# Patient Record
Sex: MALE | Race: BLACK OR AFRICAN AMERICAN | Employment: FULL TIME | URBAN - METROPOLITAN AREA
[De-identification: names, ages, dates, MRNs, and addresses within clinical notes are randomized per-mention and may not be internally consistent; named-entity substitution may affect disease eponyms.]

---

## 2017-05-20 ENCOUNTER — HOSPITAL ENCOUNTER (EMERGENCY)
Age: 37
Discharge: HOME | End: 2017-05-20
Attending: EMERGENCY MEDICINE
Payer: SELF-PAY

## 2017-05-20 ENCOUNTER — APPOINTMENT (OUTPATIENT)
Dept: GENERAL RADIOLOGY | Age: 37
End: 2017-05-20
Attending: EMERGENCY MEDICINE
Payer: SELF-PAY

## 2017-05-20 VITALS
SYSTOLIC BLOOD PRESSURE: 177 MMHG | BODY MASS INDEX: 47.74 KG/M2 | DIASTOLIC BLOOD PRESSURE: 103 MMHG | WEIGHT: 315 LBS | TEMPERATURE: 98.1 F | HEART RATE: 83 BPM | OXYGEN SATURATION: 98 % | HEIGHT: 68 IN | RESPIRATION RATE: 18 BRPM

## 2017-05-20 DIAGNOSIS — S39.012A BACK STRAIN, INITIAL ENCOUNTER: Primary | ICD-10-CM

## 2017-05-20 PROCEDURE — 96372 THER/PROPH/DIAG INJ SC/IM: CPT

## 2017-05-20 PROCEDURE — 72100 X-RAY EXAM L-S SPINE 2/3 VWS: CPT

## 2017-05-20 PROCEDURE — 74011250636 HC RX REV CODE- 250/636: Performed by: EMERGENCY MEDICINE

## 2017-05-20 PROCEDURE — 99282 EMERGENCY DEPT VISIT SF MDM: CPT

## 2017-05-20 RX ORDER — PREDNISONE 20 MG/1
TABLET ORAL
Qty: 14 TAB | Refills: 0 | Status: SHIPPED | OUTPATIENT
Start: 2017-05-20

## 2017-05-20 RX ORDER — OXYCODONE AND ACETAMINOPHEN 5; 325 MG/1; MG/1
1 TABLET ORAL
Qty: 10 TAB | Refills: 0 | Status: SHIPPED | OUTPATIENT
Start: 2017-05-20

## 2017-05-20 RX ORDER — KETOROLAC TROMETHAMINE 30 MG/ML
30 INJECTION, SOLUTION INTRAMUSCULAR; INTRAVENOUS
Status: COMPLETED | OUTPATIENT
Start: 2017-05-20 | End: 2017-05-20

## 2017-05-20 RX ADMIN — KETOROLAC TROMETHAMINE 30 MG: 30 INJECTION, SOLUTION INTRAMUSCULAR at 10:11

## 2017-05-20 NOTE — ED NOTES
Discharge instructions given by Dr Priyank Lima. Pt ambulates to front door with steady gait and in stable condition. Pt left prior to d/c vital signs.

## 2017-05-20 NOTE — DISCHARGE INSTRUCTIONS
Back Strain: Care Instructions  Your Care Instructions    Back strain happens when you overstretch, or pull, a muscle in your back. You may hurt your back in an accident or when you exercise or lift something. Most back pain will get better with rest and time. You can take care of yourself at home to help your back heal.  Follow-up care is a key part of your treatment and safety. Be sure to make and go to all appointments, and call your doctor if you are having problems. It's also a good idea to know your test results and keep a list of the medicines you take. How can you care for yourself at home? · Try to stay as active as you can, but stop or reduce any activity that causes pain. · Put ice or a cold pack on the sore muscle for 10 to 20 minutes at a time to stop swelling. Try this every 1 to 2 hours for 3 days (when you are awake) or until the swelling goes down. Put a thin cloth between the ice pack and your skin. · After 2 or 3 days, apply a heating pad on low or a warm cloth to your back. Some doctors suggest that you go back and forth between hot and cold treatments. · Take pain medicines exactly as directed. ¨ If the doctor gave you a prescription medicine for pain, take it as prescribed. ¨ If you are not taking a prescription pain medicine, ask your doctor if you can take an over-the-counter medicine. · Try sleeping on your side with a pillow between your legs. Or put a pillow under your knees when you lie on your back. These measures can ease pain in your lower back. · Return to your usual level of activity slowly. When should you call for help? Call 911 anytime you think you may need emergency care. For example, call if:  · You are unable to move a leg at all. Call your doctor now or seek immediate medical care if:  · You have new or worse symptoms in your legs, belly, or buttocks. Symptoms may include:  ¨ Numbness or tingling. ¨ Weakness. ¨ Pain.   · You lose bladder or bowel control. Watch closely for changes in your health, and be sure to contact your doctor if you are not getting better as expected. Where can you learn more? Go to http://adrian-sindy.info/. Enter M910 in the search box to learn more about \"Back Strain: Care Instructions. \"  Current as of: May 23, 2016  Content Version: 11.2  © 0536-3253 [employer]. Care instructions adapted under license by [employer] (which disclaims liability or warranty for this information). If you have questions about a medical condition or this instruction, always ask your healthcare professional. [place] [address] any warranty or liability for your use of this information. We hope that we have addressed all of your medical concerns. The examination and treatment you received in the Emergency Department were for an emergent problem and were not intended as complete care. It is important that you follow up with your healthcare provider(s) for ongoing care. If your symptoms worsen or do not improve as expected, and you are unable to reach your usual health care provider(s), you should return to the Emergency Department. Today's healthcare is undergoing tremendous change, and patient satisfaction surveys are one of the many tools to assess the quality of medical care. You may receive a survey from the [employer] regarding your experience in the Emergency Department. I hope that your experience has been completely positive, particularly the medical care that I provided. As such, please participate in the survey; anything less than excellent does not meet my expectations or intentions. [facility] and [facility] participate in nationally recognized quality of care measures.   If your blood pressure is greater than 120/80, as reported below, we urge that you seek medical care to address the potential of high blood pressure, commonly known as hypertension. Hypertension can be hereditary or can be caused by certain medical conditions, pain, stress, or \"white coat syndrome. \"       Please make an appointment with your health care provider(s) for follow up of your Emergency Department visit. VITALS:   Patient Vitals for the past 8 hrs:   Temp Pulse Resp BP SpO2   05/20/17 0901 98.1 °F (36.7 °C) 83 18 (!) 177/103 98 %          Thank you for allowing us to provide you with medical care today. We realize that you have many choices for your emergency care needs. Please choose us in the future for any continued health care needs. MD RUTH ANN Foley [facility]: [phone]            No results found for this or any previous visit (from the past 24 hour(s)). Xr Spine Lumb 2 Or 3 V    Result Date: 5/20/2017  EXAM:  XR SPINE LUMB 2 OR 3 V INDICATION:   Patient hurt his back at the gym a week ago. Pain from lower back into the left calf. COMPARISON: None. FINDINGS: AP, lateral and spot lateral views of the lumbar spine demonstrate normal alignment. The vertebral body heights and disc spaces are well-preserved. There is no fracture, subluxation or other abnormality. IMPRESSION:  Normal lumbar spine.

## 2017-05-20 NOTE — ED PROVIDER NOTES
HPI Comments: 43 y.o. male with past medical history significant for diabetes who presents from home with chief complaint of back pain. Patient states he went to the gym approximately 1 week ago and lifted weights after not going \"for a while. \" Patient reports onset of moderate lower back pain 1 day after lifting. Patient reports the pain is on both sides of the lower back, but is worse on the right side and occasionally radiates down to his right thigh. Patient notes the pain radiates down to his right calf when driving a car. Patient mentions the back pain is exacerbated upon palpation and with certain movements. Patient denies any hx of back issues in the past. Patient denies any hx of back surgeries and any recent illnesses. There are no other acute medical concerns at this time. Social hx: Patient works at a car dealership (mostly sitting). Tobacco Use: No, Alcohol Use: No    Note written by Jayleen Leon, as dictated by Moe Logan MD 9:37 AM      The history is provided by the patient. Past Medical History:   Diagnosis Date    Diabetes [facility])        History reviewed. No pertinent surgical history. History reviewed. No pertinent family history. Social History     Social History    Marital status: SINGLE     Spouse name: N/A    Number of children: N/A    Years of education: N/A     Occupational History    Not on file. Social History Main Topics    Smoking status: Never Smoker    Smokeless tobacco: Not on file    Alcohol use No    Drug use: No    Sexual activity: Not on file     Other Topics Concern    Not on file     Social History Narrative         ALLERGIES: Review of patient's allergies indicates no known allergies. Review of Systems   Constitutional: Negative for chills and fever. Respiratory: Negative for shortness of breath and wheezing. Gastrointestinal: Negative for nausea and vomiting. Musculoskeletal: Positive for back pain and myalgias. Neurological: Negative for seizures and syncope. All other systems reviewed and are negative. Vitals:    05/20/17 0901   BP: (!) 177/103   Pulse: 83   Resp: 18   Temp: 98.1 °F (36.7 °C)   SpO2: 98%   Weight: (!) 169.1 kg (372 lb 12.8 oz)   Height: 5' 8\" (1.727 m)            Physical Exam   Constitutional: He appears well-developed and well-nourished. Obesity   HENT:   Head: Normocephalic and atraumatic. Mouth/Throat: Oropharynx is clear and moist.   Eyes: EOM are normal. Pupils are equal, round, and reactive to light. Neck: Normal range of motion. Neck supple. Cardiovascular: Normal rate, regular rhythm, normal heart sounds and intact distal pulses. Exam reveals no gallop and no friction rub. No murmur heard. Pulmonary/Chest: Effort normal. No respiratory distress. He has no wheezes. He has no rales. Abdominal: Soft. There is no tenderness. There is no rebound. Musculoskeletal: Normal range of motion. He exhibits tenderness. Midline lumbar back pain   Neurological: He is alert. No cranial nerve deficit. Motor; symmetric   Skin: No erythema. Psychiatric: He has a normal mood and affect. His behavior is normal.   Nursing note and vitals reviewed. Note written by Jayleen Gutierrez, as dictated by Jacob Myles MD 9:37 AM    [facility]  ED Course       Procedures    PROGRESS NOTE:  10:49 AM  Patient has mild improvement from pain after Toradol.
